# Patient Record
Sex: MALE | Race: WHITE | NOT HISPANIC OR LATINO | Employment: OTHER | URBAN - METROPOLITAN AREA
[De-identification: names, ages, dates, MRNs, and addresses within clinical notes are randomized per-mention and may not be internally consistent; named-entity substitution may affect disease eponyms.]

---

## 2017-07-05 NOTE — PATIENT DISCUSSION
CATARACTS, OU - VISUALLY SIGNIFICANT. Pt more comfortable night driving with old specs on. Discussed old specs are single vision with AR coating.

## 2017-07-05 NOTE — PATIENT DISCUSSION
MYOPIA (progressive high) OU : Signs and symptoms of RD discussed pre and post surgical intervention. Strongly encouraged pt to establish with Dr. Elvie Saldana for clearance.

## 2017-09-22 NOTE — PATIENT DISCUSSION
- I discussed with her that the recent large change in her refraction is likely multifactorial, including changes in her cataract, glycemic fluctuation from her poorly controlled diabetes, and dry eye. I encouraged her to wait until after she sees Dr. Seth Mcintyre to get her new Rx filled in case she decides to proceed with cataract surgery at that time. She would still like a copy of today's Rx to take home with her.

## 2017-12-13 NOTE — PATIENT DISCUSSION
Cats OU - pt happy with newer glasses, patient understands that vision can change if BS changes occur.

## 2018-02-19 NOTE — PATIENT DISCUSSION
RETINA IS ATTACHED OU: LATTICE DEGENERATION OU; NO HOLES OR TEARS SEEN ON DILATED EXAM TODAY.  RETINAL DETACHMENT SIGNS AND SYMPTOMS REVIEWED

## 2019-06-07 NOTE — PATIENT DISCUSSION
New Prescription: erythromycin (erythromycin): ointment: 5 mg/gram (0.5 %) 1 a small amount four times a day as directed into right eye 06-

## 2019-06-19 NOTE — PATIENT DISCUSSION
Continue: erythromycin (erythromycin): ointment: 5 mg/gram (0.5 %) 1 a small amount four times a day as directed into right eye 06-

## 2020-01-28 NOTE — PATIENT DISCUSSION
Problem: HEMODYNAMIC STATUS  Goal: Patient has stable vital signs and fluid balance  1/28/2020 1322 by Jazmín Dan RN  Outcome: Ongoing   Patient with stable vital signs this shift. Patient tolerating diet and PO fluids well. Patient voiding adequately with BRP. Problem: Pain:  Goal: Control of acute pain  Description  Control of acute pain  1/28/2020 1322 by Jazmín Dan RN  Outcome: Ongoing   Patient complains of pain to right shoulder. Patient medicated per [facility] with prn pain medication. Patient satisfied at reassessment. Will continue to assess and monitor. Progressive - Daily wearOD-8.50+2.67466+2.5020/25&nbsp;SN &nbsp; J1+&nbsp;JG pbk

## 2020-05-14 NOTE — PATIENT DISCUSSION
General: is bilateral airspace disease concerning for pneumonia. There is right upper lung and left mid lung scarring. The cardiac silhouette is within normal limits. There is no pneumothorax or pleural effusion. 1. Bilateral airspace disease concerning for pneumonia. Recommend chest radiograph in 8 weeks to confirm resolution. Scheduled Medicines   Medications:    insulin lispro  15 Units Subcutaneous TID WC    insulin glargine  40 Units Subcutaneous Nightly    insulin lispro  0-12 Units Subcutaneous TID WC    insulin lispro  0-6 Units Subcutaneous 2 times per day    aspirin  81 mg Oral Daily    atorvastatin  10 mg Oral Daily    azithromycin  500 mg Intravenous Q24H    cefTRIAXone (ROCEPHIN) IV  1 g Intravenous Q24H    enoxaparin  1 mg/kg Subcutaneous Daily    sodium chloride flush  10 mL Intravenous 2 times per day      Infusions:    dextrose      insulin 4 Units/hr (05/14/20 0308)    sodium chloride Stopped (05/14/20 0421)    dextrose 5 % and 0.45 % NaCl 125 mL/hr at 05/14/20 0420         Objective:   Vitals: BP (!) 151/89   Pulse 98   Temp 97.3 °F (36.3 °C) (Oral)   Resp 11   Ht 6' 0.6\" (1.844 m)   Wt 190 lb (86.2 kg)   SpO2 97%   BMI 25.34 kg/m²   General appearance: alert and cooperative with exam  Neck: no JVD or bruit  Thyroid : Normal lobes   Lungs: Has Vesicular Breath sounds   Heart:  regular rate and rhythm  Abdomen: soft, non-tender; bowel sounds normal; no masses,  no organomegaly  Musculoskeletal: Normal  Extremities: extremities normal, , no edema  Neurologic:  Awake, alert, oriented to name, place and time. Cranial nerves II-XII are grossly intact. Motor is  intact. Sensory is intact. ,  and gait is normal.    Assessment:     Patient Active Problem List:     Pneumonia due to COVID-19 virus    Diabetes mellitus         DKA         Chronic chronic renal failure         Hypertension         Mild hyponatremia         Hyperkalemia/resolved      Plan:     1.  Reviewed POC blood glucose . Labs and X ray results   2. Reviewed Current Medicines   3. Will discontinue IV insulin drip  4. Will start on meal/ Correction bolus Humalog/ Basal Lantus Insulin regime   5. Monitor Blood glucose frequently   6. Modified  the dose of Insulin/ other medicines as needed   7. Will follow     .      Adriana Handy MD

## 2020-09-30 NOTE — PATIENT DISCUSSION
Pt has had numerous sinus surgery--complains of intermittent dull ache around right eye. She has seen ENT and they report no problems with sinus.

## 2020-09-30 NOTE — PATIENT DISCUSSION
Pt saw Dr. Carmine Aguilar in 2018 for cataract clearance. Seth Bedolla wants Shorty Mc to see pt  again in about  6 months for retina exam. Then continue yearly retina care.

## 2021-01-29 NOTE — PATIENT DISCUSSION
Patient requests new refraction that was completed today. Transcription error (cylinder OD) on past rx given to patient. New rx corrected error, no charge.

## 2021-02-26 NOTE — PATIENT DISCUSSION
Patient was informed that she may need to consider cataract surgery in the future if vision changes. Cataract booklet given to patient so she can start reviewing. Patient is scheduled to see Dr. Mark Busby in March.

## 2021-02-26 NOTE — PATIENT DISCUSSION
Patient came in today for a vision check in her new glasses. Vision was compared between her newest glasses prescription and her old glasses. Patient was refracted today at no charge; shown difference. Patient will have new glasses remade and wants to come back in for a vision recheck and can see a tech only.

## 2021-03-08 NOTE — PATIENT DISCUSSION
MYOPIC NON-EXUDATIVE DRY AMD OU:  NOT NECESSARY FOR PATIENT TO TAKE AREDS 2 VITAMINS AT THIS TIME. RECOMMEND HOME MONITORING OF VISION WITH AMSLER GRID AND USE OF UV PROTECTION. SMOKING AVOIDANCE REVIEWED. RETURN FOR FOLLOW-UP AS SCHEDULED.

## 2022-01-05 NOTE — PATIENT DISCUSSION
Possibly induced by the high strength of her glasses. Believe part of the problem will be corrected with cataract extraction. Patient however is not cleared at this time for surgery by Dr. Hyun Garcia.

## 2022-01-05 NOTE — PATIENT DISCUSSION
Consider pre-op: Patient complaining of a decrease in vision consistent with cataracts. Patient instructed to test eyes individually while driving at night and during daily activities. Patient states they are not currently ready for a pre-op.

## 2022-02-14 NOTE — PATIENT DISCUSSION
Possibly induced by the high strength of her glasses. Believe part of the problem will be corrected with cataract extraction. Patient however is not cleared at this time for surgery by Dr. Jatinder Hughes.

## 2022-03-02 NOTE — PATIENT DISCUSSION
Possibly induced by the high strength of her glasses. Believe part of the problem will be corrected with cataract extraction.

## 2022-04-21 NOTE — PATIENT DISCUSSION
Old note by Dr. Vaughn Breaux says she is both cleared and not cleared for cataract extraction. Will call office for clarification.

## 2022-04-21 NOTE — PATIENT DISCUSSION
Patient did have an evaluation by Dr. Sharita Rivera. Was recommended at that visit for prism lenses.

## 2022-09-08 NOTE — PATIENT DISCUSSION
Patient did have an evaluation by Dr. Malka Cespedes. Was recommended at that visit for prism lenses.

## 2022-09-08 NOTE — PATIENT DISCUSSION
Old note by Dr. Sujit Medrano says she is both cleared and not cleared for cataract extraction. Will call office for clarification.

## 2022-09-08 NOTE — PATIENT DISCUSSION
Schedule Surgery: Visually significant to patient. The patient elects to proceed with cataract extraction.  Schedule right eye first, then later in the left eye if visual symptoms persist.

## 2022-09-08 NOTE — PATIENT DISCUSSION
Old note by Dr. Stone Kemp says she is both cleared and not cleared for cataract extraction. Will call office for clarification.

## 2022-10-04 NOTE — PATIENT DISCUSSION
Old note by Dr. Elvie Saldana says she is both cleared and not cleared for cataract extraction. Will call office for clarification.

## 2022-10-04 NOTE — PATIENT DISCUSSION
Post-op Day One: Patient's visual goal is distance . The patient was given follow-up appointment instructions and drop tapering schedule. Return in 1-2 weeks for stability check with AR.

## 2022-10-12 NOTE — PATIENT DISCUSSION
Patient requests to balance her left eye with the right eye. She understands she will need reading glasses.

## 2022-10-12 NOTE — PATIENT DISCUSSION
Patient did have an evaluation by Dr. Jose Juan Emanuel. Was recommended at that visit for prism lenses.

## 2022-10-18 NOTE — PATIENT DISCUSSION
Patient did have an evaluation by Dr. Siria Jarrell. Was recommended at that visit for prism lenses.

## 2022-11-02 ENCOUNTER — EMERGENCY VISIT (OUTPATIENT)
Dept: URBAN - METROPOLITAN AREA CLINIC 45 | Facility: CLINIC | Age: 62
End: 2022-11-02

## 2022-11-02 DIAGNOSIS — H04.123: ICD-10-CM

## 2022-11-02 DIAGNOSIS — H43.811: ICD-10-CM

## 2022-11-02 DIAGNOSIS — H25.813: ICD-10-CM

## 2022-11-02 PROCEDURE — 92004 COMPRE OPH EXAM NEW PT 1/>: CPT

## 2022-11-02 ASSESSMENT — VISUAL ACUITY
OS_CC: 20/20-2
OD_CC: 20/25
OU_CC: 20/20

## 2022-11-02 ASSESSMENT — TONOMETRY
OS_IOP_MMHG: 12
OD_IOP_MMHG: 14

## 2022-11-02 NOTE — PATIENT DISCUSSION
Patient did have an evaluation by Dr. Von Richardson. Was recommended at that visit for prism lenses.

## 2022-11-09 NOTE — PATIENT DISCUSSION
Post-op: Patient's visual goal is distance. The patient was given follow-up appointment instructions and is to continue the drop tapering schedule as directed. Return in 1 month for final stability check with AR, MR, and DFE.

## 2022-11-09 NOTE — PATIENT DISCUSSION
Patient did have an evaluation by Dr. Carolina Haynes. Was recommended at that visit for prism lenses.

## 2022-11-09 NOTE — PATIENT DISCUSSION
Continue use of Refresh Celluvisc. Patient has stopped all drops. Re-start Pred TID and Oflox BID OD only.

## 2022-11-23 ENCOUNTER — CONSULTATION/EVALUATION (OUTPATIENT)
Dept: URBAN - METROPOLITAN AREA CLINIC 43 | Facility: CLINIC | Age: 62
End: 2022-11-23

## 2022-11-23 DIAGNOSIS — H43.811: ICD-10-CM

## 2022-11-23 DIAGNOSIS — H25.813: ICD-10-CM

## 2022-11-23 DIAGNOSIS — H25.89: ICD-10-CM

## 2022-11-23 DIAGNOSIS — H04.123: ICD-10-CM

## 2022-11-23 DIAGNOSIS — H52.7: ICD-10-CM

## 2022-11-23 PROCEDURE — 99214 OFFICE O/P EST MOD 30 MIN: CPT

## 2022-11-23 PROCEDURE — 92136TC INTERFEROMETRY - TECHNICAL COMPONENT

## 2022-11-23 PROCEDURE — 92025 CPTRIZED CORNEAL TOPOGRAPHY: CPT

## 2022-11-23 PROCEDURE — 92015 DETERMINE REFRACTIVE STATE: CPT

## 2022-11-23 PROCEDURE — V2799PMN IMPRIMIS PRED-MOXI-NEPAF 5ML

## 2022-11-23 PROCEDURE — 92134 CPTRZ OPH DX IMG PST SGM RTA: CPT

## 2022-11-23 PROCEDURE — 99199RSD RESIDENT SHADOWING PROVIDER

## 2022-11-23 ASSESSMENT — VISUAL ACUITY
OS_BAT: 20/60
OD_CC: J6
OS_SC: 20/80
OD_SC: 20/60
OD_CC: 20/30-2
OS_SC: <J12
OD_BAT: 20/60
OS_CC: 20/25
OD_SC: <J12
OS_CC: J4

## 2022-11-23 ASSESSMENT — TONOMETRY
OD_IOP_MMHG: 12
OS_IOP_MMHG: 11

## 2022-12-07 NOTE — PATIENT DISCUSSION
Patient did have an evaluation by Dr. Waqar Montero. Was recommended at that visit for prism lenses.

## 2022-12-07 NOTE — PATIENT DISCUSSION
Post-op final: Final post-operative appointment. Patient has completed all post-op drops as instructed and will return for regular stability check exams. Patient deferred dilation.

## 2022-12-09 ENCOUNTER — POST-OP (OUTPATIENT)
Dept: URBAN - METROPOLITAN AREA CLINIC 39 | Facility: CLINIC | Age: 62
End: 2022-12-09

## 2022-12-09 ENCOUNTER — SURGERY/PROCEDURE (OUTPATIENT)
Dept: URBAN - METROPOLITAN AREA CLINIC 43 | Facility: CLINIC | Age: 62
End: 2022-12-09

## 2022-12-09 PROCEDURE — 66984AV REMOVE CATARACT, INSERT ADVANCED LENS

## 2022-12-09 PROCEDURE — 66999LNSR LENSAR LASER FOR CAT SX

## 2022-12-09 PROCEDURE — 99211T TECH SERVICE

## 2022-12-09 PROCEDURE — 65772LRI LRI DURING CAT SX

## 2022-12-09 ASSESSMENT — VISUAL ACUITY: OD_SC: 20/50

## 2022-12-09 ASSESSMENT — TONOMETRY: OD_IOP_MMHG: 20

## 2022-12-12 ENCOUNTER — POST-OP (OUTPATIENT)
Dept: URBAN - METROPOLITAN AREA CLINIC 47 | Facility: CLINIC | Age: 62
End: 2022-12-12

## 2022-12-12 PROCEDURE — 99024 POSTOP FOLLOW-UP VISIT: CPT

## 2022-12-12 ASSESSMENT — TONOMETRY: OD_IOP_MMHG: 15

## 2022-12-12 ASSESSMENT — VISUAL ACUITY
OD: J10
OD_SC: J10
OD_SC: 20/25

## 2022-12-14 ENCOUNTER — POST OP/EVAL OF SECOND EYE (OUTPATIENT)
Dept: URBAN - METROPOLITAN AREA CLINIC 45 | Facility: CLINIC | Age: 62
End: 2022-12-14

## 2022-12-14 PROCEDURE — 99024 POSTOP FOLLOW-UP VISIT: CPT

## 2022-12-14 PROCEDURE — 99213 OFFICE O/P EST LOW 20 MIN: CPT

## 2022-12-14 ASSESSMENT — VISUAL ACUITY
OD: J6
OS_SC: 20/150
OD_SC: 20/25
OD_SC: J6
OS_BAT: 20/60

## 2022-12-16 ENCOUNTER — PRE-OP/H&P (OUTPATIENT)
Dept: URBAN - METROPOLITAN AREA SURGERY 14 | Facility: SURGERY | Age: 62
End: 2022-12-16

## 2022-12-16 ENCOUNTER — SURGERY/PROCEDURE (OUTPATIENT)
Dept: URBAN - METROPOLITAN AREA CLINIC 43 | Facility: CLINIC | Age: 62
End: 2022-12-16

## 2022-12-16 ENCOUNTER — TECH ONLY (OUTPATIENT)
Dept: URBAN - METROPOLITAN AREA CLINIC 39 | Facility: CLINIC | Age: 62
End: 2022-12-16

## 2022-12-16 PROCEDURE — 66999LNSR LENSAR LASER FOR CAT SX

## 2022-12-16 PROCEDURE — 99211T TECH SERVICE

## 2022-12-16 PROCEDURE — 65772LRI LRI DURING CAT SX

## 2022-12-16 PROCEDURE — 66984AV REMOVE CATARACT, INSERT ADVANCED LENS

## 2022-12-16 PROCEDURE — 99211HP H&P OFFICE/OUTPATIENT VISIT, EST

## 2022-12-16 ASSESSMENT — VISUAL ACUITY: OS_SC: 20/30

## 2022-12-16 ASSESSMENT — TONOMETRY: OS_IOP_MMHG: 16

## 2022-12-19 ENCOUNTER — POST-OP (OUTPATIENT)
Dept: URBAN - METROPOLITAN AREA CLINIC 47 | Facility: CLINIC | Age: 62
End: 2022-12-19

## 2022-12-19 PROCEDURE — 99024 POSTOP FOLLOW-UP VISIT: CPT

## 2022-12-19 ASSESSMENT — VISUAL ACUITY
OS: J6
OU_SC: 20/20
OS_SC: 20/30
OU_SC: J1
OU: J2
OS_SC: J2

## 2023-01-10 ENCOUNTER — POST-OP (OUTPATIENT)
Dept: URBAN - METROPOLITAN AREA CLINIC 45 | Facility: CLINIC | Age: 63
End: 2023-01-10

## 2023-01-10 DIAGNOSIS — Z96.1: ICD-10-CM

## 2023-01-10 PROCEDURE — 99024 POSTOP FOLLOW-UP VISIT: CPT

## 2023-01-10 ASSESSMENT — VISUAL ACUITY
OS_SC: 20/25
OU_SC: 20/30
OU_SC: J4
OD_SC: 20/40+2
OS_SC: J4
OD_SC: J10

## 2023-01-13 ENCOUNTER — TECH ONLY (OUTPATIENT)
Dept: URBAN - METROPOLITAN AREA CLINIC 43 | Facility: CLINIC | Age: 63
End: 2023-01-13

## 2023-01-13 DIAGNOSIS — Z96.1: ICD-10-CM

## 2023-01-13 PROCEDURE — 99211T TECH SERVICE

## 2023-01-13 PROCEDURE — 92134 CPTRZ OPH DX IMG PST SGM RTA: CPT

## 2023-01-19 ENCOUNTER — POST-OP (OUTPATIENT)
Dept: URBAN - METROPOLITAN AREA CLINIC 43 | Facility: CLINIC | Age: 63
End: 2023-01-19

## 2023-01-19 DIAGNOSIS — Z96.1: ICD-10-CM

## 2023-01-19 PROCEDURE — 99024 POSTOP FOLLOW-UP VISIT: CPT

## 2023-01-19 RX ORDER — LOTEPREDNOL ETABONATE 3.8 MG/G: 1 GEL OPHTHALMIC

## 2023-01-19 ASSESSMENT — VISUAL ACUITY
OD_SC: 20/40+2
OD_BAT: 20/30
OD_SC: J8
OS_SC: J2
OS_BAT: 20/30
OS_SC: 20/20

## 2023-01-19 ASSESSMENT — TONOMETRY
OS_IOP_MMHG: 10
OD_IOP_MMHG: 10

## 2023-06-07 ENCOUNTER — FOLLOW UP (OUTPATIENT)
Dept: URBAN - METROPOLITAN AREA CLINIC 43 | Facility: CLINIC | Age: 63
End: 2023-06-07

## 2023-06-07 DIAGNOSIS — Z98.890: ICD-10-CM

## 2023-06-07 DIAGNOSIS — H04.123: ICD-10-CM

## 2023-06-07 DIAGNOSIS — Z96.1: ICD-10-CM

## 2023-06-07 DIAGNOSIS — H52.7: ICD-10-CM

## 2023-06-07 PROCEDURE — 99213 OFFICE O/P EST LOW 20 MIN: CPT

## 2023-06-07 RX ORDER — MOXIFLOXACIN HYDROCHLORIDE 5 MG/ML: 1 SOLUTION/ DROPS OPHTHALMIC

## 2023-06-07 RX ORDER — PREDNISOLONE ACETATE 10 MG/ML: 1 SUSPENSION/ DROPS OPHTHALMIC

## 2023-06-07 ASSESSMENT — VISUAL ACUITY
OD_SC: 20/25-2
OS_SC: 20/25-2
OS_SC: J3
OD_SC: J8

## 2023-06-07 ASSESSMENT — TONOMETRY
OS_IOP_MMHG: 9
OD_IOP_MMHG: 9

## 2023-07-06 ENCOUNTER — SURGERY/PROCEDURE (OUTPATIENT)
Dept: URBAN - METROPOLITAN AREA CLINIC 39 | Facility: CLINIC | Age: 63
End: 2023-07-06

## 2023-07-06 DIAGNOSIS — H52.7: ICD-10-CM

## 2023-07-06 PROCEDURE — 66999ISPP INTRALASE LASIK S/P ADVANCED / CUSTOM VISION < 12 MONTHS

## 2023-07-07 ENCOUNTER — POST-OP (OUTPATIENT)
Dept: URBAN - METROPOLITAN AREA CLINIC 43 | Facility: CLINIC | Age: 63
End: 2023-07-07

## 2023-07-07 DIAGNOSIS — Z98.890: ICD-10-CM

## 2023-07-07 PROCEDURE — 6699955 NON-COMANAGED LASIK PO

## 2023-07-07 ASSESSMENT — VISUAL ACUITY
OS_SC: J4
OD_SC: J10
OD_SC: 20/25
OS_SC: 20/30-1

## 2023-07-13 ENCOUNTER — POST-OP (OUTPATIENT)
Dept: URBAN - METROPOLITAN AREA CLINIC 45 | Facility: CLINIC | Age: 63
End: 2023-07-13

## 2023-07-13 DIAGNOSIS — Z98.890: ICD-10-CM

## 2023-07-13 PROCEDURE — 99024 POSTOP FOLLOW-UP VISIT: CPT

## 2023-07-13 ASSESSMENT — VISUAL ACUITY
OS_SC: 20/25
OD_SC: 20/25-2
OD_SC: J6+
OU_SC: J2
OS_SC: J3
OU_SC: 20/20-2

## 2023-08-24 ENCOUNTER — POST-OP (OUTPATIENT)
Dept: URBAN - METROPOLITAN AREA CLINIC 45 | Facility: CLINIC | Age: 63
End: 2023-08-24

## 2023-08-24 DIAGNOSIS — Z98.890: ICD-10-CM

## 2023-08-24 PROCEDURE — 99024 POSTOP FOLLOW-UP VISIT: CPT

## 2023-08-24 ASSESSMENT — VISUAL ACUITY
OD_SC: 20/20
OD_SC: J2
OU_SC: 20/20+1
OS_SC: J1
OS_SC: 20/30-2
OU_SC: J1

## 2024-08-21 ENCOUNTER — COMPREHENSIVE EXAM (OUTPATIENT)
Dept: URBAN - METROPOLITAN AREA CLINIC 38 | Facility: CLINIC | Age: 64
End: 2024-08-21

## 2024-08-21 DIAGNOSIS — H04.123: ICD-10-CM

## 2024-08-21 DIAGNOSIS — H43.811: ICD-10-CM

## 2024-08-21 PROCEDURE — 92014 COMPRE OPH EXAM EST PT 1/>: CPT

## 2024-08-21 ASSESSMENT — VISUAL ACUITY
OS_SC: J2
OU_SC: 20/20-1
OD_SC: 20/25+2
OU_SC: J1
OD_SC: J3-
OS_SC: 20/20-1

## 2024-08-21 ASSESSMENT — TONOMETRY
OS_IOP_MMHG: 16
OD_IOP_MMHG: 17
